# Patient Record
Sex: FEMALE | Race: WHITE
[De-identification: names, ages, dates, MRNs, and addresses within clinical notes are randomized per-mention and may not be internally consistent; named-entity substitution may affect disease eponyms.]

---

## 2017-11-04 ENCOUNTER — HOSPITAL ENCOUNTER (EMERGENCY)
Dept: HOSPITAL 4 - SED | Age: 72
Discharge: HOME | End: 2017-11-04
Payer: COMMERCIAL

## 2017-11-04 VITALS — SYSTOLIC BLOOD PRESSURE: 140 MMHG

## 2017-11-04 VITALS — HEIGHT: 63 IN | BODY MASS INDEX: 39.34 KG/M2 | WEIGHT: 222 LBS

## 2017-11-04 VITALS — SYSTOLIC BLOOD PRESSURE: 153 MMHG

## 2017-11-04 DIAGNOSIS — R03.0: ICD-10-CM

## 2017-11-04 DIAGNOSIS — N39.0: ICD-10-CM

## 2017-11-04 DIAGNOSIS — Z90.49: ICD-10-CM

## 2017-11-04 DIAGNOSIS — Z98.51: ICD-10-CM

## 2017-11-04 DIAGNOSIS — K59.00: Primary | ICD-10-CM

## 2017-11-04 DIAGNOSIS — E66.9: ICD-10-CM

## 2017-11-04 LAB
ALBUMIN SERPL BCP-MCNC: 3.5 G/DL (ref 3.4–4.8)
ALT SERPL W P-5'-P-CCNC: 46 U/L (ref 12–78)
ANION GAP SERPL CALCULATED.3IONS-SCNC: 6 MMOL/L (ref 5–15)
APPEARANCE UR: CLEAR
AST SERPL W P-5'-P-CCNC: 21 U/L (ref 10–37)
BACTERIA URNS QL MICRO: (no result) /HPF
BASOPHILS # BLD AUTO: 0.1 K/UL (ref 0–0.2)
BASOPHILS NFR BLD AUTO: 0.9 % (ref 0–2)
BILIRUB SERPL-MCNC: 0.4 MG/DL (ref 0–1)
BILIRUB UR QL STRIP: NEGATIVE
BUN SERPL-MCNC: 15 MG/DL (ref 8–21)
CALCIUM SERPL-MCNC: 8.9 MG/DL (ref 8.4–11)
CHLORIDE SERPL-SCNC: 109 MMOL/L (ref 98–107)
COLOR UR: YELLOW
CREAT SERPL-MCNC: 0.81 MG/DL (ref 0.55–1.3)
EOSINOPHIL # BLD AUTO: 0.1 K/UL (ref 0–0.4)
EOSINOPHIL NFR BLD AUTO: 2.1 % (ref 0–4)
ERYTHROCYTE [DISTWIDTH] IN BLOOD BY AUTOMATED COUNT: 12.7 % (ref 9–15)
GFR SERPL CREATININE-BSD FRML MDRD: (no result) ML/MIN (ref 90–?)
GLUCOSE SERPL-MCNC: 127 MG/DL (ref 70–99)
GLUCOSE UR STRIP-MCNC: NEGATIVE MG/DL
HCT VFR BLD AUTO: 42 % (ref 36–48)
HGB BLD-MCNC: 14 G/DL (ref 12–16)
HGB UR QL STRIP: NEGATIVE
KETONES UR STRIP-MCNC: NEGATIVE MG/DL
LEUKOCYTE ESTERASE UR QL STRIP: (no result)
LIPASE SERPL-CCNC: 87 U/L (ref 73–393)
LYMPHOCYTES # BLD AUTO: 1.5 K/UL (ref 1–5.5)
LYMPHOCYTES NFR BLD AUTO: 22.7 % (ref 20.5–51.5)
MCH RBC QN AUTO: 33 PG (ref 27–31)
MCHC RBC AUTO-ENTMCNC: 33 % (ref 32–36)
MCV RBC AUTO: 98 FL (ref 79–98)
MONOCYTES # BLD MANUAL: 0.5 K/UL (ref 0–1)
MONOCYTES # BLD MANUAL: 8.1 % (ref 1.7–9.3)
MUCOUS THREADS URNS QL MICRO: (no result) /LPF
NEUTROPHILS # BLD AUTO: 4.3 K/UL (ref 1.8–7.7)
NEUTROPHILS NFR BLD AUTO: 66.2 % (ref 40–70)
NITRITE UR QL STRIP: NEGATIVE
PH UR STRIP: 6 [PH] (ref 5–8)
PLATELET # BLD AUTO: 317 K/UL (ref 130–430)
POTASSIUM SERPL-SCNC: 3.8 MMOL/L (ref 3.5–5.1)
PROT UR QL STRIP: NEGATIVE
RBC # BLD AUTO: 4.28 MIL/UL (ref 4.2–6.2)
RBC #/AREA URNS HPF: (no result) /HPF (ref 0–3)
SODIUM SERPLBLD-SCNC: 140 MMOL/L (ref 136–145)
SP GR UR STRIP: 1.02 (ref 1–1.03)
UROBILINOGEN UR STRIP-MCNC: 0.2 MG/DL (ref 0.2–1)
WBC # BLD AUTO: 6.5 K/UL (ref 4.8–10.8)
WBC #/AREA URNS HPF: (no result) /HPF (ref 0–3)

## 2017-11-04 PROCEDURE — 85025 COMPLETE CBC W/AUTO DIFF WBC: CPT

## 2017-11-04 PROCEDURE — 36415 COLL VENOUS BLD VENIPUNCTURE: CPT

## 2017-11-04 PROCEDURE — 80053 COMPREHEN METABOLIC PANEL: CPT

## 2017-11-04 PROCEDURE — 99285 EMERGENCY DEPT VISIT HI MDM: CPT

## 2017-11-04 PROCEDURE — 81000 URINALYSIS NONAUTO W/SCOPE: CPT

## 2017-11-04 PROCEDURE — 96374 THER/PROPH/DIAG INJ IV PUSH: CPT

## 2017-11-04 PROCEDURE — 74176 CT ABD & PELVIS W/O CONTRAST: CPT

## 2017-11-04 PROCEDURE — 83690 ASSAY OF LIPASE: CPT

## 2017-11-04 PROCEDURE — 87086 URINE CULTURE/COLONY COUNT: CPT

## 2017-11-04 PROCEDURE — 96361 HYDRATE IV INFUSION ADD-ON: CPT

## 2018-01-31 ENCOUNTER — HOSPITAL ENCOUNTER (OUTPATIENT)
Dept: HOSPITAL 4 - SLB | Age: 73
Discharge: HOME | End: 2018-01-31
Attending: INTERNAL MEDICINE
Payer: COMMERCIAL

## 2018-01-31 DIAGNOSIS — R91.1: Primary | ICD-10-CM

## 2018-01-31 LAB
ALBUMIN SERPL BCP-MCNC: 3.6 G/DL (ref 3.4–4.8)
ALT SERPL W P-5'-P-CCNC: 42 U/L (ref 12–78)
ANION GAP SERPL CALCULATED.3IONS-SCNC: 3 MMOL/L (ref 5–15)
AST SERPL W P-5'-P-CCNC: 20 U/L (ref 10–37)
BASOPHILS # BLD AUTO: 0 K/UL (ref 0–0.2)
BASOPHILS NFR BLD AUTO: 0.7 % (ref 0–2)
BILIRUB SERPL-MCNC: 0.5 MG/DL (ref 0–1)
BUN SERPL-MCNC: 14 MG/DL (ref 8–21)
CALCIUM SERPL-MCNC: 10.4 MG/DL (ref 8.4–11)
CHLORIDE SERPL-SCNC: 108 MMOL/L (ref 98–107)
CREAT SERPL-MCNC: 0.6 MG/DL (ref 0.55–1.3)
EOSINOPHIL # BLD AUTO: 0.2 K/UL (ref 0–0.4)
EOSINOPHIL NFR BLD AUTO: 2.8 % (ref 0–4)
ERYTHROCYTE [DISTWIDTH] IN BLOOD BY AUTOMATED COUNT: 11.8 % (ref 9–15)
GFR SERPL CREATININE-BSD FRML MDRD: (no result) ML/MIN (ref 90–?)
GLUCOSE SERPL-MCNC: 103 MG/DL (ref 70–99)
HCT VFR BLD AUTO: 43.1 % (ref 36–48)
HGB BLD-MCNC: 14.6 G/DL (ref 12–16)
LYMPHOCYTES # BLD AUTO: 1.6 K/UL (ref 1–5.5)
LYMPHOCYTES NFR BLD AUTO: 24.8 % (ref 20.5–51.5)
MCH RBC QN AUTO: 33 PG (ref 27–31)
MCHC RBC AUTO-ENTMCNC: 34 % (ref 32–36)
MCV RBC AUTO: 97 FL (ref 79–98)
MONOCYTES # BLD MANUAL: 0.7 K/UL (ref 0–1)
MONOCYTES # BLD MANUAL: 11.1 % (ref 1.7–9.3)
NEUTROPHILS # BLD AUTO: 3.9 K/UL (ref 1.8–7.7)
NEUTROPHILS NFR BLD AUTO: 60.6 % (ref 40–70)
PLATELET # BLD AUTO: 321 K/UL (ref 130–430)
POTASSIUM SERPL-SCNC: 3.9 MMOL/L (ref 3.5–5.1)
RBC # BLD AUTO: 4.47 MIL/UL (ref 4.2–6.2)
SODIUM SERPLBLD-SCNC: 140 MMOL/L (ref 136–145)
WBC # BLD AUTO: 6.4 K/UL (ref 4.8–10.8)

## 2018-06-01 ENCOUNTER — HOSPITAL ENCOUNTER (OUTPATIENT)
Dept: HOSPITAL 4 - SMA | Age: 73
Discharge: HOME | End: 2018-06-01
Attending: FAMILY MEDICINE
Payer: COMMERCIAL

## 2018-06-01 DIAGNOSIS — Z12.31: Primary | ICD-10-CM

## 2018-06-08 ENCOUNTER — HOSPITAL ENCOUNTER (OUTPATIENT)
Dept: HOSPITAL 4 - SMI | Age: 73
Discharge: HOME | End: 2018-06-08
Attending: FAMILY MEDICINE
Payer: COMMERCIAL

## 2018-06-08 DIAGNOSIS — M19.011: Primary | ICD-10-CM

## 2018-08-03 ENCOUNTER — HOSPITAL ENCOUNTER (EMERGENCY)
Dept: HOSPITAL 4 - SED | Age: 73
Discharge: HOME | End: 2018-08-03
Payer: COMMERCIAL

## 2018-08-03 VITALS — HEIGHT: 63 IN | WEIGHT: 195 LBS | BODY MASS INDEX: 34.55 KG/M2

## 2018-08-03 VITALS — SYSTOLIC BLOOD PRESSURE: 146 MMHG

## 2018-08-03 VITALS — SYSTOLIC BLOOD PRESSURE: 139 MMHG

## 2018-08-03 DIAGNOSIS — S43.004A: Primary | ICD-10-CM
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
## 2018-08-03 DIAGNOSIS — I10: ICD-10-CM

## 2018-08-03 PROCEDURE — 99284 EMERGENCY DEPT VISIT MOD MDM: CPT

## 2018-08-03 PROCEDURE — 73030 X-RAY EXAM OF SHOULDER: CPT

## 2018-08-03 PROCEDURE — 96374 THER/PROPH/DIAG INJ IV PUSH: CPT

## 2018-08-03 PROCEDURE — 23650 CLTX SHO DSLC W/MNPJ WO ANES: CPT

## 2018-11-16 ENCOUNTER — HOSPITAL ENCOUNTER (EMERGENCY)
Dept: HOSPITAL 4 - SED | Age: 73
Discharge: HOME | End: 2018-11-16
Payer: COMMERCIAL

## 2018-11-16 VITALS — BODY MASS INDEX: 39.51 KG/M2 | HEIGHT: 63 IN | WEIGHT: 223 LBS

## 2018-11-16 VITALS — SYSTOLIC BLOOD PRESSURE: 123 MMHG
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
## 2018-11-16 DIAGNOSIS — I10: ICD-10-CM
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
## 2018-11-16 DIAGNOSIS — S99.921A: Primary | ICD-10-CM

## 2018-11-16 NOTE — NUR
Patient given written and verbal discharge instructions and verbalizes 
understanding.  ER MD discussed with patient the results and treatment 
provided. Patient in stable condition. ID arm band removed.

Rx of [place] given. Patient educated on pain management and to follow up with 
PMD in 2-3 days. Pain Scale 0/10

Opportunity for questions provided and answered. Medication side effect fact 
sheet provided.

## 2018-11-16 NOTE — NUR
-------------------------------------------------------------------------------

           *** Note alberto in EDM - 11/16/18 at 1840 by [email or web address] ***            

-------------------------------------------------------------------------------

Patient to [facility] 08  to [facility] for evaluation. Side rails up.

## 2018-11-16 NOTE — NUR
Patient brought in with  ambulatory to [facility].  Patient complaining of 
right toe pain starting today s/p trip and fall at home over a carpet.   Pain 
6/10.   Mild swelling noted at joint.  Denies KO  No ecchymosis noted.   
Limited ROM.  No other complaints/injuries per patient or as noted. Will 
continue to monitor.

## 2019-01-22 ENCOUNTER — HOSPITAL ENCOUNTER (OUTPATIENT)
Dept: HOSPITAL 4 - SCT | Age: 74
Discharge: HOME | End: 2019-01-22
Attending: FAMILY MEDICINE
Payer: COMMERCIAL

## 2019-01-22 DIAGNOSIS — K57.90: ICD-10-CM

## 2019-01-22 DIAGNOSIS — Z90.49: ICD-10-CM

## 2019-01-22 DIAGNOSIS — K76.0: ICD-10-CM

## 2019-01-22 DIAGNOSIS — Z98.84: ICD-10-CM

## 2019-01-22 DIAGNOSIS — M41.86: ICD-10-CM

## 2019-01-22 DIAGNOSIS — K44.9: Primary | ICD-10-CM

## 2019-01-22 DIAGNOSIS — I70.90: ICD-10-CM

## 2019-01-22 DIAGNOSIS — M47.816: ICD-10-CM

## 2019-07-03 ENCOUNTER — HOSPITAL ENCOUNTER (EMERGENCY)
Dept: HOSPITAL 4 - SED | Age: 74
Discharge: HOME | End: 2019-07-03
Payer: COMMERCIAL

## 2019-07-03 VITALS — BODY MASS INDEX: 30.65 KG/M2 | HEIGHT: 63 IN | WEIGHT: 173 LBS

## 2019-07-03 VITALS — SYSTOLIC BLOOD PRESSURE: 125 MMHG

## 2019-07-03 VITALS — SYSTOLIC BLOOD PRESSURE: 120 MMHG
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
## 2019-07-03 DIAGNOSIS — S05.11XA: Primary | ICD-10-CM
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
## 2019-07-03 DIAGNOSIS — I10: ICD-10-CM

## 2019-07-03 DIAGNOSIS — S90.812A: ICD-10-CM

## 2019-07-03 DIAGNOSIS — Z98.84: ICD-10-CM

## 2019-07-03 RX ADMIN — BACITRACIN ZINC ONE GM: 500 OINTMENT TOPICAL at 14:15

## 2019-07-03 RX ADMIN — CLOSTRIDIUM TETANI TOXOID ANTIGEN (FORMALDEHYDE INACTIVATED), CORYNEBACTERIUM DIPHTHERIAE TOXOID ANTIGEN (FORMALDEHYDE INACTIVATED), BORDETELLA PERTUSSIS TOXOID ANTIGEN (GLUTARALDEHYDE INACTIVATED), BORDETELLA PERTUSSIS FILAMENTOUS HEMAGGLUTININ ANTIGEN (FORMALDEHYDE INACTIVATED), BORDETELLA PERTUSSIS PERTACTIN ANTIGEN, AND BORDETELLA PERTUSSIS FIMBRIAE 2/3 ANTIGEN ONE ML: 5; 2; 2.5; 5; 3; 5 INJECTION, SUSPENSION INTRAMUSCULAR at 12:50

## 2019-08-27 ENCOUNTER — HOSPITAL ENCOUNTER (OUTPATIENT)
Dept: HOSPITAL 4 - SMA | Age: 74
Discharge: HOME | End: 2019-08-27
Attending: FAMILY MEDICINE
Payer: COMMERCIAL

## 2019-08-27 DIAGNOSIS — Z12.31: Primary | ICD-10-CM

## 2019-09-07 ENCOUNTER — HOSPITAL ENCOUNTER (EMERGENCY)
Dept: HOSPITAL 4 - SED | Age: 74
Discharge: STILL ADMITTED OR ADMINISTRATIVE | End: 2019-09-07
Payer: COMMERCIAL

## 2019-09-07 VITALS — HEIGHT: 63 IN | WEIGHT: 166 LBS | BODY MASS INDEX: 29.41 KG/M2

## 2019-09-07 VITALS — SYSTOLIC BLOOD PRESSURE: 150 MMHG

## 2019-09-07 VITALS — SYSTOLIC BLOOD PRESSURE: 143 MMHG

## 2019-09-07 DIAGNOSIS — G89.29: Primary | ICD-10-CM

## 2019-09-07 DIAGNOSIS — I10: ICD-10-CM

## 2019-09-07 DIAGNOSIS — Z90.49: ICD-10-CM

## 2019-09-07 DIAGNOSIS — M25.562: ICD-10-CM

## 2019-09-07 PROCEDURE — 96372 THER/PROPH/DIAG INJ SC/IM: CPT

## 2019-09-07 PROCEDURE — 99283 EMERGENCY DEPT VISIT LOW MDM: CPT
